# Patient Record
Sex: FEMALE | Race: WHITE | URBAN - METROPOLITAN AREA
[De-identification: names, ages, dates, MRNs, and addresses within clinical notes are randomized per-mention and may not be internally consistent; named-entity substitution may affect disease eponyms.]

---

## 2017-01-02 PROCEDURE — G0180 MD CERTIFICATION HHA PATIENT: HCPCS | Performed by: INTERNAL MEDICINE

## 2017-01-06 ENCOUNTER — OFFICE VISIT (OUTPATIENT)
Dept: OPTOMETRY | Facility: CLINIC | Age: 82
End: 2017-01-06
Payer: COMMERCIAL

## 2017-01-06 DIAGNOSIS — H01.02A SQUAMOUS BLEPHARITIS OF UPPER AND LOWER EYELIDS OF BOTH EYES: Primary | ICD-10-CM

## 2017-01-06 DIAGNOSIS — H01.02B SQUAMOUS BLEPHARITIS OF UPPER AND LOWER EYELIDS OF BOTH EYES: Primary | ICD-10-CM

## 2017-01-06 DIAGNOSIS — H26.9 CATARACTS, BOTH EYES: ICD-10-CM

## 2017-01-06 DIAGNOSIS — H40.1490 PSEUDOEXFOLIATION (PXF) GLAUCOMA: ICD-10-CM

## 2017-01-06 PROCEDURE — 99202 OFFICE O/P NEW SF 15 MIN: CPT | Performed by: OPTOMETRIST

## 2017-01-06 ASSESSMENT — CUP TO DISC RATIO
OS_RATIO: 0.8
OD_RATIO: 0.8

## 2017-01-06 ASSESSMENT — TONOMETRY
IOP_METHOD: TONOPEN
OS_IOP_MMHG: 18
OD_IOP_MMHG: 21

## 2017-01-06 ASSESSMENT — VISUAL ACUITY
OS_CC: 20/50
OS_CC+: -1
OD_CC+: -1
CORRECTION_TYPE: GLASSES
METHOD: SNELLEN - LINEAR
OD_CC: 20/60

## 2017-01-06 ASSESSMENT — EXTERNAL EXAM - LEFT EYE: OS_EXAM: NORMAL

## 2017-01-06 ASSESSMENT — EXTERNAL EXAM - RIGHT EYE: OD_EXAM: NORMAL

## 2017-01-06 NOTE — PATIENT INSTRUCTIONS
Warm packs nightly.  Lid scrubs nightly with Ocusoft of Systane pads or with diluted baby shampoo.  Artificial tears 3-4 x daily (Systane Balance)    You have cataracts which are affecting your vision.  A change in glasses will not give you much improvement.  A cataract evaluation can be scheduled with the eye surgeon to discuss with you the option of cataract surgery.     Referral to Dr. Martins at [facility] for cataract and glaucoma evaluation.    Rashawn Mahajan, OD

## 2017-01-06 NOTE — MR AVS SNAPSHOT
After Visit Summary   1/6/2017    Brissa Hunter    MRN: [id number]           Patient Information     Date Of Birth          1935        Visit Information        Provider Department      1/6/2017 11:30 AM Rashawn Mahajan, OD [facility]        Today's Diagnoses     Squamous blepharitis of upper and lower eyelids of both eyes    -  1     Cataracts, both eyes         Pseudoexfoliation (PXF) glaucoma           Care Instructions    Warm packs nightly.  Lid scrubs nightly with Ocusoft of Systane pads or with diluted baby shampoo.  Artificial tears 3-4 x daily (Systane Balance)    You have cataracts which are affecting your vision.  A change in glasses will not give you much improvement.  A cataract evaluation can be scheduled with the eye surgeon to discuss with you the option of cataract surgery.     Referral to Dr. Martins at [facility] for cataract and glaucoma evaluation.    Rashawn Mahajan, MARCELA          Follow-ups after your visit        Additional Services     OPHTHALMOLOGY ADULT REFERRAL       Your provider has referred you to:  [facility]: [facility] [phone]   [email or web address]      Please be aware that coverage of these services is subject to the terms and limitations of your health insurance plan.  Call member services at your health plan with any benefit or coverage questions.      Please bring the following to your appointment:  >>   Any x-rays, CTs or MRIs which have been performed.  Contact the facility where they were done to arrange for  prior to your scheduled appointment.  Any new CT, MRI or other procedures ordered by your specialist must be performed at a [facility] or coordinated by your clinic's referral office.    >>   List of current medications   >>   This referral request   >>   Any documents/labs given to you for this referral                  Your  next 10 appointments already scheduled     2017 10:00 AM   New Visit with David Martins MD,  OPH NURSE ONLY   [facility] ([facility]    [address] [address] [place] [address] [phone]            Mar 01, 2017 11:40 AM   Return Visit with Radha Castanon MD   [facility] ([facility])    [address] [address] [address] [phone]              Who to contact     If you have questions or need follow up information about today's clinic visit or your schedule please contact [facility] directly at [phone].  Normal or non-critical lab and imaging results will be communicated to you by MyChart, letter or phone within 4 business days after the clinic has received the results. If you do not hear from us within 7 days, please contact the clinic through MyChart or phone. If you have a critical or abnormal lab result, we will notify you by phone as soon as possible.  Submit refill requests through [employer] or call your pharmacy and they will forward the refill request to us. Please allow 3 business days for your refill to be completed.          Additional Information About Your Visit        [employer] Information     [employer] is an electronic gateway that provides easy, online access to your medical records. With [employer], you can request a clinic appointment, read your test results, renew a prescription or communicate with your care team.     To sign up for [employer] visit the website at [email or web address]   You will be asked to enter the access code listed below, as well as some personal information. Please follow the directions to create your username and password.     Your access code is: [email or web address]  Expires: 3/28/2017 11:55 AM     Your access code will  in 90 days. If you need help or a new code, please contact your [facility] or call  [phone] for assistance.        Care EveryWhere ID     This is your Care EveryWhere ID. This could be used by other organizations to access your [place] medical records  [id number]         Blood Pressure from Last 3 Encounters:   12/28/16 110/70   06/27/16 150/50    Weight from Last 3 Encounters:   12/28/16 64.411 kg (142 lb)   06/27/16 60.328 kg (133 lb)              We Performed the Following     OPHTHALMOLOGY ADULT REFERRAL        Primary Care Provider Office Phone # Fax #    Radha Castanon [phone]       [facility] [address]  [facility] [address]        Thank you!     Thank you for choosing [facility]  for your care. Our goal is always to provide you with excellent care. Hearing back from our patients is one way we can continue to improve our services. Please take a few minutes to complete the written survey that you may receive in the mail after your visit with us. Thank you!             Your Updated Medication List - Protect others around you: Learn how to safely use, store and throw away your medicines at www.disposemymeds.org.          This list is accurate as of: 1/6/17 12:31 PM.  Always use your most recent med list.                   Brand Name Dispense Instructions for use    ascorbic acid 500 MG tablet    VITAMIN C     Take 1 tablet by mouth daily       aspirin EC 81 MG EC tablet      Take 1 tablet by mouth daily       calcium-vitamin D 600-400 MG-UNIT per tablet    CALTRATE     Take 1 tablet by mouth daily       MULTI-VITAMINS Tabs      Take 1 tablet by mouth daily       vitamin E 400 UNIT capsule      Take 1 capsule by mouth daily

## 2017-01-06 NOTE — PROGRESS NOTES
Chief Complaint   Patient presents with     Eye Problem     os eye infection     Saw Dr. Castanon- 12/28/16 and was prescribed Polytrim left eye due to red left eye.  She referred for follow up and comprehensive evaluation.  Patient had an eye exam at [employer] and got new glasses 1-2 months ago.    Eyes feel: better-    Medications used: polymyxin b    How often: 3 times per day    Patient wants to know if it is pink eye      Kelley Simon, Optometric Assistant, A.B.O.C.     OBJECTIVE:     See ophthalmology exam      ASSESSMENT:        ICD-10-CM    1. Squamous blepharitis of upper and lower eyelids of both eyes H01.021     H01.022     H01.024     H01.025    2. Cataracts, both eyes H26.9 OPHTHALMOLOGY ADULT REFERRAL   3. Pseudoexfoliation (PXF) glaucoma H40.1490 OPHTHALMOLOGY ADULT REFERRAL          PLAN:       Patient Instructions   Warm packs nightly.  Lid scrubs nightly with Ocusoft of Systane pads or with diluted baby shampoo.  Artificial tears 3-4 x daily (Systane Balance)    You have cataracts which are affecting your vision.  A change in glasses will not give you much improvement.  A cataract evaluation can be scheduled with the eye surgeon to discuss with you the option of cataract surgery.     Referral to Dr. Martins at [email or web address] for cataract and glaucoma evaluation.    Rashawn Mahajan, OD

## 2017-01-12 ENCOUNTER — TELEPHONE (OUTPATIENT)
Dept: PEDIATRICS | Facility: CLINIC | Age: 82
End: 2017-01-12

## 2017-01-12 NOTE — TELEPHONE ENCOUNTER
[facility] Call Center    Phone Message    Name of Caller: Brissa    Phone Number: Home number on file [phone] (home)    Best time to return call: ANY    May a detailed message be left on voicemail: yes    Relation to patient: Self    Reason for Call: Other: Patient is wondering about the note that Dr. Castanon wrote about her dementia. Wants to speak to Dr. Castanon directly, whenever possible.     Action Taken: Message routed to:  Primary Care p [phone]

## 2017-01-13 ENCOUNTER — DOCUMENTATION ONLY (OUTPATIENT)
Dept: CARE COORDINATION | Facility: CLINIC | Age: 82
End: 2017-01-13

## 2017-01-13 DIAGNOSIS — Z71.89 ADVANCED DIRECTIVES, COUNSELING/DISCUSSION: Primary | ICD-10-CM

## 2017-01-13 NOTE — PROGRESS NOTES
[facility] now requests orders and shares plan of care/discharge summaries for some patients through [employer].  Please REPLY TO THIS MESSAGE in order to give authorization for orders when needed.  This is considered a verbal order, you will still receive a faxed copy of orders for signature.  Thank you for your assistance in improving collaboration for our patients.      DISCHARGE SUMMARY  Patient participated in the Short Cognitive Performance Test with a score of 4.5/5.6, Global Deterioration Scale 5, and Short Blessed Test with a score of 16/28. Results indicate a moderate functional decline from abstract to concrete thought processes. Patient requires 24 hour supervision. Abstract thought processes including memory, judgment, reasoning, and planning ahead are very impaired. Familiar concrete tasks such as dressing and grooming are remembered, but the quality of performance may show a decline, not noted in this patients case. Persons at this level do best with simplified tasks and the same routine. Complex tasks, such as meal preparation, medication instructions, and finances need to be done by others, per Gregorio Cognitive Level 4. It is also recommended that patient not be driving based on her cognitive test scores. Patient will be staying in her home with her , per daughters report. Recommendations given to daughter and spouse based on cognitive level, and recommendations for preparation for potential further decline provided.. Patient will not be driving, license taken away, and will receive all necessary assistance/ supervision from spouse. Patient is physically fit and able to ambulate on uneven surfaces and up and down stairs without an AD. Patient is able to use telephone to answer calls, difficulty noted in placing calls. Patient had also seen  for 1 visit for initiation of advanced care planning and community resources provided. PT for balance assessment, scored a 28/28 on  Jossetti, indicative of minimal fall risk. SN for dementia education. Patient and daughter requested discharge from all homecare services, due to increased anxiety with people coming in and out of her home.

## 2017-01-23 ENCOUNTER — PRE VISIT (OUTPATIENT)
Dept: OPHTHALMOLOGY | Facility: CLINIC | Age: 82
End: 2017-01-23

## 2017-01-23 NOTE — TELEPHONE ENCOUNTER
January 23, 2017                  Chief Complaint   Patient presents with     Previsit     appt w/ ophthalmology     Cataract Evaluation     Glaucoma Evaluation       Pre-Visit Documentation: Brissa Hunter is a 81 year old female      Current Outpatient Prescriptions   Medication Sig Dispense Refill     aspirin EC 81 MG tablet Take 1 tablet by mouth daily       calcium-vitamin D (CALTRATE) 600-400 MG-UNIT per tablet Take 1 tablet by mouth daily       Multiple Vitamin (MULTI-VITAMINS) TABS Take 1 tablet by mouth daily       ascorbic acid (VITAMIN C) 500 MG tablet Take 1 tablet by mouth daily       vitamin E 400 UNIT capsule Take 1 capsule by mouth daily         Mayra ROACH COA.

## 2017-01-26 NOTE — PROGRESS NOTES
Reviewed POLST: indicated CPR, limited interventions and treatment reversible conditions: trial intubation allow HC agents make decision pending on medical advise.

## 2017-02-15 ENCOUNTER — OFFICE VISIT (OUTPATIENT)
Dept: NEUROLOGY | Facility: CLINIC | Age: 82
End: 2017-02-15
Payer: COMMERCIAL

## 2017-02-15 VITALS
OXYGEN SATURATION: 95 % | HEART RATE: 69 BPM | WEIGHT: 144.2 LBS | BODY MASS INDEX: 24.75 KG/M2 | DIASTOLIC BLOOD PRESSURE: 77 MMHG | SYSTOLIC BLOOD PRESSURE: 170 MMHG

## 2017-02-15 DIAGNOSIS — F03.90 DEMENTIA WITHOUT BEHAVIORAL DISTURBANCE, UNSPECIFIED DEMENTIA TYPE: ICD-10-CM

## 2017-02-15 DIAGNOSIS — R41.3 MEMORY LOSS: Primary | ICD-10-CM

## 2017-02-15 PROCEDURE — 99202 OFFICE O/P NEW SF 15 MIN: CPT | Performed by: PSYCHIATRY & NEUROLOGY

## 2017-02-15 NOTE — LETTER
"2/15/2017      RE: Brissa Hunter  [address] [facility] [address]       HISTORY OF PRESENT ILLNESS:  Brissa Hunter is an 81-year-old female referred by Dr. Castanon for evaluation of memory loss.  She is accompanied by her .      According to the patient she has had some issues with her memory for a few years.  It has probably been at least 5 years, according to her .  It is notable that Dr. Castanon mentioned that her  may also have mild cognitive impairment.      She does tell me that she will misplace things.  She may forget appointments.  She quit driving at Dr. Castanon's recommendation, although she and her  indicate she had never gotten lost.  Her  has always handled the bills.  She does very little shopping and they get some of their food by home delivery.  She does keep up the house.  She has not demonstrated any unsafe behaviors such as leaving the stove on or the door open.      This has all been a gradual process.  There have been no sudden neurologic symptoms or focal neurologic symptoms.  She denies headaches.      Her past medical history is otherwise unremarkable.  She is really not taking any medication on a regular basis, although she will sporadically take aspirin 81 mg.      ALLERGIES:  No medical allergies.      FAMILY HISTORY:  Notable for perhaps the patient's mother having had trouble with memory later in life.      SOCIAL HISTORY:  She does live with her .  She does not smoke and rarely has a drink of wine.      PHYSICAL EXAMINATION:   GENERAL:  Reveals a pleasant and neatly dressed, elderly female.   VITAL SIGNS:  Heart rate 69.  Blood pressure 170/77.   NEUROLOGIC:  She scored 20/30 on the Mini-Mental State Examination.  She had 5 errors with orientation, failed to recall any of 3 objects, and 1 error spelling the word \"world\" backward.  She could not copy a design accurately.      Her clock drawing was accurate in terms of placement of the numerals but " she was unable to place the hands on the clock at the requested time.      Pupils are equal, round and react well to light.  Visual fields are intact.  Speech is clear.  She rarely makes paraphasic error with her spontaneous speech, but otherwise no major language disturbance is noted.      CRANIAL NERVES:  Cranial nerves II-XII are intact.   MOTOR:  Sensory examinations are normal.   GAIT, STATION AND COORDINATION:  Finger-to-nose is done well.  Her gait is unremarkable for age.  Muscle tone is normal.   REFLEXES:  2+ except for the left ankle jerk which is absent.  Plantar responses are flexor.      IMPRESSION:  Cognitive impairment, likely dementia.      PLAN:  Dr. Castanon had ordered a number of laboratory studies when the patient was seen on 2016 but she did not go to the lab to have these drawn.  I have therefore recommended that she do so.  This will include a CBC, thyroid functions, B12, comprehensive metabolic panel, vitamin D level, lipids, hemoglobin A1c .  I have added a methylmalonic acid level.      She has not had any brain imaging done.  We discussed doing an MRI scan, but she would prefer not to do one because of potential claustrophobia.  She did, however, agree to have a head CT scan done which will be ordered.      I briefly touched upon symptomatic treatment for her memory with donepezil and reviewed potential side effects.  She will think about this.  I did write out the name of the medication and advised the patient and her  they could contact her children to discuss that with them as well.      I do plan to see her back in a few weeks to review testing.  At that point also rediscuss a trial of donepezil.         CHANCE MALLOY MD           cc: Radha Castanon MD      D: 02/15/2017 15:47   T: 02/15/2017 16:16   MT: ARMIN#[id number]      Name:     JACKELYN COLUNGA   MRN:      [id number]        Account:      [id number]   :      1935           Visit Date:   02/15/2017      Document: [id number]

## 2017-02-15 NOTE — PROGRESS NOTES
"HISTORY OF PRESENT ILLNESS:  Brissa Hunter is an 81-year-old female referred by Dr. Castanon for evaluation of memory loss.  She is accompanied by her .      According to the patient she has had some issues with her memory for a few years.  It has probably been at least 5 years, according to her .  It is notable that Dr. Castanon mentioned that her  may also have mild cognitive impairment.      She does tell me that she will misplace things.  She may forget appointments.  She quit driving at Dr. Castanon's recommendation, although she and her  indicate she had never gotten lost.  Her  has always handled the bills.  She does very little shopping and they get some of their food by home delivery.  She does keep up the house.  She has not demonstrated any unsafe behaviors such as leaving the stove on or the door open.      This has all been a gradual process.  There have been no sudden neurologic symptoms or focal neurologic symptoms.  She denies headaches.      Her past medical history is otherwise unremarkable.  She is really not taking any medication on a regular basis, although she will sporadically take aspirin 81 mg.      ALLERGIES:  No medical allergies.      FAMILY HISTORY:  Notable for perhaps the patient's mother having had trouble with memory later in life.      SOCIAL HISTORY:  She does live with her .  She does not smoke and rarely has a drink of wine.      PHYSICAL EXAMINATION:   GENERAL:  Reveals a pleasant and neatly dressed, elderly female.   VITAL SIGNS:  Heart rate 69.  Blood pressure 170/77.   NEUROLOGIC:  She scored 20/30 on the Mini-Mental State Examination.  She had 5 errors with orientation, failed to recall any of 3 objects, and 1 error spelling the word \"world\" backward.  She could not copy a design accurately.      Her clock drawing was accurate in terms of placement of the numerals but she was unable to place the hands on the clock at the requested time.      Pupils " are equal, round and react well to light.  Visual fields are intact.  Speech is clear.  She rarely makes paraphasic error with her spontaneous speech, but otherwise no major language disturbance is noted.      CRANIAL NERVES:  Cranial nerves II-XII are intact.   MOTOR:  Sensory examinations are normal.   GAIT, STATION AND COORDINATION:  Finger-to-nose is done well.  Her gait is unremarkable for age.  Muscle tone is normal.   REFLEXES:  2+ except for the left ankle jerk which is absent.  Plantar responses are flexor.      IMPRESSION:  Cognitive impairment, likely dementia.      PLAN:  Dr. Castanon had ordered a number of laboratory studies when the patient was seen on 2016 but she did not go to the lab to have these drawn.  I have therefore recommended that she do so.  This will include a CBC, thyroid functions, B12, comprehensive metabolic panel, vitamin D level, lipids, hemoglobin A1c .  I have added a methylmalonic acid level.      She has not had any brain imaging done.  We discussed doing an MRI scan, but she would prefer not to do one because of potential claustrophobia.  She did, however, agree to have a head CT scan done which will be ordered.      I briefly touched upon symptomatic treatment for her memory with donepezil and reviewed potential side effects.  She will think about this.  I did write out the name of the medication and advised the patient and her  they could contact her children to discuss that with them as well.      I do plan to see her back in a few weeks to review testing.  At that point also rediscuss a trial of donepezil.         CHANCE MALLOY MD             D: 02/15/2017 15:47   T: 02/15/2017 16:16   MT: ARMIN#[id number]      Name:     JACKELYN COLUNGA   MRN:      [id number]        Account:      [id number]   :      1935           Visit Date:   02/15/2017      Document: [id number]       cc: Radha Castanon MD

## 2017-02-15 NOTE — PATIENT INSTRUCTIONS
The medication we discussed for your memory is Donepezil (Aricept)      Thank you for choosing [facility]. It was a pleasure to see you for your office visit today.     The following is a summary of your office visit:      Appointments Made today:Return appointment with Dr. Bonilla 3/08/17 at 8:00am    Nurse/clinic contact information:[facility] [phone]    Further instructions for your care: Call and scheduled CT scan- (head) [phone].   This [facility] done at [facility]     (Patient and her  requested to scheduled the CT scan from home after checking their calender.)        If you had any blood work, imaging or other test we will be in touch regarding the results. If there is anything abnormal you will be contacted by phone and if the results are normal you will receive a letter in the mail. If you have any questions or concerns please contact us at [phone].

## 2017-02-15 NOTE — LETTER
2/15/2017       RE: Brissa Hunter  [address] [facility] [address]     Dear Colleague,    Thank you for referring your patient, Brissa Hunter, to the [facility]. Please see a copy of my visit note below.    No notes on file    Again, thank you for allowing me to participate in the care of your patient.      Sincerely,    Allen Bonilla MD

## 2017-02-15 NOTE — NURSING NOTE
"Brissa Hunter's goals for this visit include: Dementia  She requests these members of her care team be copied on today's visit information: yes    PCP: Radha Castanon    Referring Provider:  Radha Castanon MD  [facility]  [address] [place] [address]    Chief Complaint   Patient presents with     Consult       Initial /77 (BP Location: Left arm, Patient Position: Chair, Cuff Size: Adult Regular)  Pulse 69  Wt 65.4 kg (144 lb 3.2 oz)  SpO2 95%  BMI 24.75 kg/m2 Estimated body mass index is 24.75 kg/(m^2) as calculated from the following:    Height as of 6/27/16: 1.626 m (5' 4\").    Weight as of this encounter: 65.4 kg (144 lb 3.2 oz).  Medication Reconciliation: complete    Do you need any medication refills at today's visit? no    "

## 2017-02-15 NOTE — MR AVS SNAPSHOT
After Visit Summary   2/15/2017    Brissa Hunter    MRN: [id number]           Patient Information     Date Of Birth          1935        Visit Information        Provider Department      2/15/2017 3:30 PM Allen Bonilla MD [facility]        Today's Diagnoses     Memory loss    -  1    Dementia without behavioral disturbance, unspecified dementia type          Care Instructions    The medication we discussed for your memory is Donepezil (Aricept)      Thank you for choosing [facility]. It was a pleasure to see you for your office visit today.     The following is a summary of your office visit:      Appointments Made today:Return appointment with Dr. Bonilla 3/08/17 at 8:00am    Nurse/clinic contact information:[facility] [phone]    Further instructions for your care: Call and scheduled CT scan- (head) [phone].   This can be done at [facility]     (Patient and her  requested to scheduled the CT scan from home after checking their calender.)        If you had any blood work, imaging or other test we will be in touch regarding the results. If there is anything abnormal you will be contacted by phone and if the results are normal you will receive a letter in the mail. If you have any questions or concerns please contact us at [phone].                 Follow-ups after your visit        Follow-up notes from your care team     Inform patient at next visit Return in about 2 weeks (around 3/1/2017).      Your next 10 appointments already scheduled     Mar 01, 2017 11:40 AM CST   Return Visit with Radha Castanon MD   [facility])    [address] [place] [address] [phone]            Mar 08, 2017  8:00 AM CST   Return Visit with Allen Bonilla MD   [facility])    [address] [address]  [address]   [phone]              Future tests that were ordered for you today     Open Future Orders        Priority Expected Expires Ordered    CT Head w/o Contrast Routine 2017 2/15/2018 2/15/2017            Who to contact     If you have questions or need follow up information about today's clinic visit or your schedule please contact [facility] directly at [phone].  Normal or non-critical lab and imaging results will be communicated to you by HOMETRAXhart, letter or phone within 4 business days after the clinic has received the results. If you do not hear from us within 7 days, please contact the clinic through HOMETRAXhart or phone. If you have a critical or abnormal lab result, we will notify you by phone as soon as possible.  Submit refill requests through Physicians Formula or call your pharmacy and they will forward the refill request to us. Please allow 3 business days for your refill to be completed.          Additional Information About Your Visit        Physicians Formula Information     [employer]icians Formula is an electronic gateway that provides easy, online access to your medical records. With [employer] Formula, you can request a clinic appointment, read your test results, renew a prescription or communicate with your care team.     To sign up for Physicians Formula visit the website at [email or web address]   You will be asked to enter the access code listed below, as well as some personal information. Please follow the directions to create your username and password.     Your access code is: [email or web address]  Expires: 3/28/2017 11:55 AM     Your access code will  in 90 days. If you need help or a new code, please contact your [facility] or call [phone] for assistance.        Care EveryWhere ID     This is your Care EveryWhere ID. This could be used by other organizations to access your [facility] medical records  [id number]        Your Vitals Were     Pulse Pulse Oximetry BMI (Body Mass  Index)             69 95% 24.75 kg/m2          Blood Pressure from Last 3 Encounters:   02/15/17 170/77   12/28/16 110/70   06/27/16 150/50    Weight from Last 3 Encounters:   02/15/17 65.4 kg (144 lb 3.2 oz)   12/28/16 64.4 kg (142 lb)   06/27/16 60.3 kg (133 lb)              We Performed the Following     Methylmalonic acid        Primary Care Provider Office Phone # Fax #    Radha Castanon [phone]       [facility] [address]  [facility] [address]        Thank you!     Thank you for choosing [facility]  for your care. Our goal is always to provide you with excellent care. Hearing back from our patients is one way we can continue to improve our services. Please take a few minutes to complete the written survey that you may receive in the mail after your visit with us. Thank you!             Your Updated Medication List - Protect others around you: Learn how to safely use, store and throw away your medicines at www.disposemymeds.org.          This list is accurate as of: 2/15/17  3:46 PM.  Always use your most recent med list.                   Brand Name Dispense Instructions for use    ascorbic acid 500 MG tablet    VITAMIN C     Take 1 tablet by mouth daily       aspirin EC 81 MG EC tablet      Take 1 tablet by mouth daily       calcium-vitamin D 600-400 MG-UNIT per tablet    CALTRATE     Take 1 tablet by mouth daily       MULTI-VITAMINS Tabs      Take 1 tablet by mouth daily       vitamin E 400 UNIT capsule      Take 1 capsule by mouth daily

## 2017-02-16 ENCOUNTER — TELEPHONE (OUTPATIENT)
Dept: NEUROLOGY | Facility: CLINIC | Age: 82
End: 2017-02-16

## 2017-02-16 NOTE — TELEPHONE ENCOUNTER
Writer tried to call the number listed in the pt's chart but it went right to , a message was left on the  requesting a call back.  Carrie Al RN

## 2017-02-16 NOTE — TELEPHONE ENCOUNTER
----- Message from Allen Bonilla MD sent at 2/16/2017  6:37 AM CST -----  Regarding: Labs  Patient did not go to lab after her visit with me. Could you please call her  and ask him to bring her in to have labs done? Thanks JULIUS Bonilal

## 2017-02-17 NOTE — TELEPHONE ENCOUNTER
Writer tried to call again but there was no answer and no ability to leave a message on the cell phone listed.  Carrie Al RN

## 2017-02-21 NOTE — TELEPHONE ENCOUNTER
Called and spoke with . Patient has a appt with Dr. Castanon on 3/1/17. They will get labs done at that time. Lab appt made.  Darla Severin-Brown, LPN

## 2017-03-01 ENCOUNTER — OFFICE VISIT (OUTPATIENT)
Dept: PEDIATRICS | Facility: CLINIC | Age: 82
End: 2017-03-01
Payer: COMMERCIAL

## 2017-03-01 VITALS
BODY MASS INDEX: 24.37 KG/M2 | TEMPERATURE: 97.2 F | DIASTOLIC BLOOD PRESSURE: 78 MMHG | SYSTOLIC BLOOD PRESSURE: 144 MMHG | HEART RATE: 75 BPM | WEIGHT: 142 LBS

## 2017-03-01 DIAGNOSIS — R41.3 MEMORY LOSS: ICD-10-CM

## 2017-03-01 DIAGNOSIS — F03.90 DEMENTIA WITHOUT BEHAVIORAL DISTURBANCE, UNSPECIFIED DEMENTIA TYPE: ICD-10-CM

## 2017-03-01 DIAGNOSIS — R73.09 BLOOD GLUCOSE ABNORMAL: ICD-10-CM

## 2017-03-01 DIAGNOSIS — Z13.6 CARDIOVASCULAR SCREENING; LDL GOAL LESS THAN 160: ICD-10-CM

## 2017-03-01 DIAGNOSIS — E78.00 PURE HYPERCHOLESTEROLEMIA: ICD-10-CM

## 2017-03-01 DIAGNOSIS — E78.5 HYPERLIPIDEMIA LDL GOAL <160: ICD-10-CM

## 2017-03-01 DIAGNOSIS — F03.90 DEMENTIA WITHOUT BEHAVIORAL DISTURBANCE, UNSPECIFIED DEMENTIA TYPE: Primary | ICD-10-CM

## 2017-03-01 LAB
ALBUMIN SERPL-MCNC: 3.9 G/DL (ref 3.4–5)
ALP SERPL-CCNC: 65 U/L (ref 40–150)
ALT SERPL W P-5'-P-CCNC: 24 U/L (ref 0–50)
ANION GAP SERPL CALCULATED.3IONS-SCNC: 7 MMOL/L (ref 3–14)
AST SERPL W P-5'-P-CCNC: 21 U/L (ref 0–45)
BASOPHILS # BLD AUTO: 0 10E9/L (ref 0–0.2)
BASOPHILS NFR BLD AUTO: 0.5 %
BILIRUB SERPL-MCNC: 1.4 MG/DL (ref 0.2–1.3)
BUN SERPL-MCNC: 23 MG/DL (ref 7–30)
CALCIUM SERPL-MCNC: 9.2 MG/DL (ref 8.5–10.1)
CHLORIDE SERPL-SCNC: 105 MMOL/L (ref 94–109)
CHOLEST SERPL-MCNC: 257 MG/DL
CO2 SERPL-SCNC: 29 MMOL/L (ref 20–32)
CREAT SERPL-MCNC: 0.87 MG/DL (ref 0.52–1.04)
DIFFERENTIAL METHOD BLD: NORMAL
EOSINOPHIL # BLD AUTO: 0.1 10E9/L (ref 0–0.7)
EOSINOPHIL NFR BLD AUTO: 1.2 %
ERYTHROCYTE [DISTWIDTH] IN BLOOD BY AUTOMATED COUNT: 13.3 % (ref 10–15)
GFR SERPL CREATININE-BSD FRML MDRD: 62 ML/MIN/1.7M2
GLUCOSE SERPL-MCNC: 86 MG/DL (ref 70–99)
HBA1C MFR BLD: 5.5 % (ref 4.3–6)
HCT VFR BLD AUTO: 40.6 % (ref 35–47)
HDLC SERPL-MCNC: 88 MG/DL
HGB BLD-MCNC: 13.4 G/DL (ref 11.7–15.7)
LDLC SERPL CALC-MCNC: 149 MG/DL
LYMPHOCYTES # BLD AUTO: 1.3 10E9/L (ref 0.8–5.3)
LYMPHOCYTES NFR BLD AUTO: 22.8 %
MCH RBC QN AUTO: 30.7 PG (ref 26.5–33)
MCHC RBC AUTO-ENTMCNC: 33 G/DL (ref 31.5–36.5)
MCV RBC AUTO: 93 FL (ref 78–100)
MONOCYTES # BLD AUTO: 0.4 10E9/L (ref 0–1.3)
MONOCYTES NFR BLD AUTO: 7.1 %
NEUTROPHILS # BLD AUTO: 3.9 10E9/L (ref 1.6–8.3)
NEUTROPHILS NFR BLD AUTO: 68.4 %
NONHDLC SERPL-MCNC: 169 MG/DL
PLATELET # BLD AUTO: 206 10E9/L (ref 150–450)
POTASSIUM SERPL-SCNC: 3.9 MMOL/L (ref 3.4–5.3)
PROT SERPL-MCNC: 7.8 G/DL (ref 6.8–8.8)
RBC # BLD AUTO: 4.37 10E12/L (ref 3.8–5.2)
SODIUM SERPL-SCNC: 141 MMOL/L (ref 133–144)
T4 FREE SERPL-MCNC: 0.79 NG/DL (ref 0.76–1.46)
TRIGL SERPL-MCNC: 100 MG/DL
TSH SERPL DL<=0.05 MIU/L-ACNC: 3.12 MU/L (ref 0.4–4)
VIT B12 SERPL-MCNC: 525 PG/ML (ref 193–986)
WBC # BLD AUTO: 5.7 10E9/L (ref 4–11)

## 2017-03-01 PROCEDURE — 84439 ASSAY OF FREE THYROXINE: CPT | Performed by: INTERNAL MEDICINE

## 2017-03-01 PROCEDURE — 80053 COMPREHEN METABOLIC PANEL: CPT | Performed by: INTERNAL MEDICINE

## 2017-03-01 PROCEDURE — 82306 VITAMIN D 25 HYDROXY: CPT | Performed by: INTERNAL MEDICINE

## 2017-03-01 PROCEDURE — 82607 VITAMIN B-12: CPT | Performed by: INTERNAL MEDICINE

## 2017-03-01 PROCEDURE — 80061 LIPID PANEL: CPT | Performed by: INTERNAL MEDICINE

## 2017-03-01 PROCEDURE — 85025 COMPLETE CBC W/AUTO DIFF WBC: CPT | Performed by: INTERNAL MEDICINE

## 2017-03-01 PROCEDURE — 84443 ASSAY THYROID STIM HORMONE: CPT | Performed by: INTERNAL MEDICINE

## 2017-03-01 PROCEDURE — 36415 COLL VENOUS BLD VENIPUNCTURE: CPT | Performed by: INTERNAL MEDICINE

## 2017-03-01 PROCEDURE — 83921 ORGANIC ACID SINGLE QUANT: CPT | Mod: 90 | Performed by: INTERNAL MEDICINE

## 2017-03-01 PROCEDURE — 83036 HEMOGLOBIN GLYCOSYLATED A1C: CPT | Performed by: INTERNAL MEDICINE

## 2017-03-01 PROCEDURE — 99000 SPECIMEN HANDLING OFFICE-LAB: CPT | Performed by: INTERNAL MEDICINE

## 2017-03-01 PROCEDURE — 99213 OFFICE O/P EST LOW 20 MIN: CPT | Performed by: INTERNAL MEDICINE

## 2017-03-01 NOTE — PATIENT INSTRUCTIONS
Make an appointment for:  Clinic follow up with Dr. Castanon in 5 week.     I will contact Dr. Bonilla's office regarding prescribing memory medication.     If you have side effect of the medicine, you can stop taking it and notify the clinic.

## 2017-03-01 NOTE — Clinical Note
This patient is now accepting medication for dementia. Could you check with Dr. Bonilla about prescribing Donepezil for her (suggested in his note).?  Thanks. Dr. Castanon

## 2017-03-01 NOTE — MR AVS SNAPSHOT
After Visit Summary   3/1/2017    Brissa Hunter    MRN: [id number]           Patient Information     Date Of Birth          1935        Visit Information        Provider Department      3/1/2017 11:40 AM Radha Castanon MD [facility]        Care Instructions    Make an appointment for:  Clinic follow up with Dr. Castanon in 5 week.     I will contact Dr. Bonilla's office regarding prescribing memory medication.     If you have side effect of the medicine, you can stop taking it and notify the clinic.               Follow-ups after your visit        Your next 10 appointments already scheduled     Mar 08, 2017  8:00 AM CST   Return Visit with Allen Bonilla MD   [facility]    [address] [place] [address] [phone]              Who to contact     If you have questions or need follow up information about today's clinic visit or your schedule please contact [facility] directly at [phone].  Normal or non-critical lab and imaging results will be communicated to you by FoodByNethart, letter or phone within 4 business days after the clinic has received the results. If you do not hear from us within 7 days, please contact the clinic through FoodByNethart or phone. If you have a critical or abnormal lab result, we will notify you by phone as soon as possible.  Submit refill requests through Blackberry or call your pharmacy and they will forward the refill request to us. Please allow 3 business days for your refill to be completed.          Additional Information About Your Visit        FoodByNethart Information     Blackberry is an electronic gateway that provides easy, online access to your medical records. With Blackberry, you can request a clinic appointment, read your test results, renew a prescription or communicate with your care team.     To sign up for Blackberry visit the website at [email or web address]   You will be  asked to enter the access code listed below, as well as some personal information. Please follow the directions to create your username and password.     Your access code is: [email or web address]  Expires: 3/28/2017 11:55 AM     Your access code will  in 90 days. If you need help or a new code, please contact your [facility] or call [phone] for assistance.        Care EveryWhere ID     This is your Care EveryWhere ID. This could be used by other organizations to access your [facility] medical records  [id number]        Your Vitals Were     Pulse Temperature BMI (Body Mass Index)             75 97.2  F (36.2  C) (Temporal) 24.37 kg/m2          Blood Pressure from Last 3 Encounters:   17 144/78   02/15/17 170/77   16 110/70    Weight from Last 3 Encounters:   17 142 lb (64.4 kg)   02/15/17 144 lb 3.2 oz (65.4 kg)   16 142 lb (64.4 kg)              Today, you had the following     No orders found for display       Primary Care Provider Office Phone # Fax #    Radha Castanon [phone]       [facility] [address] [facility] [address]        Thank you!     Thank you for choosing [facility]  for your care. Our goal is always to provide you with excellent care. Hearing back from our patients is one way we can continue to improve our services. Please take a few minutes to complete the written survey that you may receive in the mail after your visit with us. Thank you!             Your Updated Medication List - Protect others around you: Learn how to safely use, store and throw away your medicines at www.disposemymeds.org.          This list is accurate as of: 3/1/17 12:13 PM.  Always use your most recent med list.                   Brand Name Dispense Instructions for use    ascorbic acid 500 MG tablet    VITAMIN C     Take 1 tablet by mouth daily       aspirin EC 81 MG EC tablet      Take 1 tablet by mouth daily        calcium-vitamin D 600-400 MG-UNIT per tablet    CALTRATE     Take 1 tablet by mouth daily       MULTI-VITAMINS Tabs      Take 1 tablet by mouth daily       vitamin E 400 UNIT capsule      Take 1 capsule by mouth daily

## 2017-03-01 NOTE — PROGRESS NOTES
SUBJECTIVE:                                                    Brissa Hunter is a 81 year old female who presents to clinic today for the following health issues:      Follow up memory  Maryjo Oakley RMTESSIE Brumfield  does not have any pertinent problems on file.     She saw Dr. Bonilla in neurology for memory loss. Patient does not recall that appointment at all. She recalled that a man was to talk her about dementia but when she went, no one was there. She didn't know where she was to meet this men.     Per Dr. Bonilla's note, doneprazil was discussed but she wanted to think about it. Pt now says she is not adverse to taking medication to help with memory. Her  said she doesn't like to take medication in general.     I asked her today again about trying medication to help her memory. She said she will try it.     She has no other complaints. Her , with memory loss, is still driving. Their children are involved in getting them to assisted living. Pt and her  are now in favor of this set up.          Problem list, Medication list, Allergies, and Medical/Social/Surgical histories reviewed in EPIC and updated as appropriate.    OBJECTIVE:                                                    /78  Pulse 75  Temp 97.2  F (36.2  C) (Temporal)  Wt 142 lb (64.4 kg)  BMI 24.37 kg/m2    GEN: healthy, alert and no distress      Diagnostic test results:  Results for orders placed or performed in visit on 03/01/17 (from the past 24 hour(s))   Lipid Profile with reflex to direct LDL   Result Value Ref Range    Cholesterol 257 (H) <200 mg/dL    Triglycerides 100 <150 mg/dL    HDL Cholesterol 88 >49 mg/dL    LDL Cholesterol Calculated 149 (H) <100 mg/dL    Non HDL Cholesterol 169 (H) <130 mg/dL   Comprehensive metabolic panel (BMP + Alb, Alk Phos, ALT, AST, Total. Bili, TP)   Result Value Ref Range    Sodium 141 133 - 144 mmol/L    Potassium 3.9 3.4 - 5.3 mmol/L    Chloride 105 94 - 109 mmol/L     Carbon Dioxide 29 20 - 32 mmol/L    Anion Gap 7 3 - 14 mmol/L    Glucose 86 70 - 99 mg/dL    Urea Nitrogen 23 7 - 30 mg/dL    Creatinine 0.87 0.52 - 1.04 mg/dL    GFR Estimate 62 >60 mL/min/1.7m2    GFR Estimate If Black 75 >60 mL/min/1.7m2    Calcium 9.2 8.5 - 10.1 mg/dL    Bilirubin Total 1.4 (H) 0.2 - 1.3 mg/dL    Albumin 3.9 3.4 - 5.0 g/dL    Protein Total 7.8 6.8 - 8.8 g/dL    Alkaline Phosphatase 65 40 - 150 U/L    ALT 24 0 - 50 U/L    AST 21 0 - 45 U/L   TSH   Result Value Ref Range    TSH 3.12 0.40 - 4.00 mU/L   T4, free   Result Value Ref Range    T4 Free 0.79 0.76 - 1.46 ng/dL   CBC with platelets and differential   Result Value Ref Range    WBC 5.7 4.0 - 11.0 10e9/L    RBC Count 4.37 3.8 - 5.2 10e12/L    Hemoglobin 13.4 11.7 - 15.7 g/dL    Hematocrit 40.6 35.0 - 47.0 %    MCV 93 78 - 100 fl    MCH 30.7 26.5 - 33.0 pg    MCHC 33.0 31.5 - 36.5 g/dL    RDW 13.3 10.0 - 15.0 %    Platelet Count 206 150 - 450 10e9/L    Diff Method Automated Method     % Neutrophils 68.4 %    % Lymphocytes 22.8 %    % Monocytes 7.1 %    % Eosinophils 1.2 %    % Basophils 0.5 %    Absolute Neutrophil 3.9 1.6 - 8.3 10e9/L    Absolute Lymphocytes 1.3 0.8 - 5.3 10e9/L    Absolute Monocytes 0.4 0.0 - 1.3 10e9/L    Absolute Eosinophils 0.1 0.0 - 0.7 10e9/L    Absolute Basophils 0.0 0.0 - 0.2 10e9/L   Hemoglobin A1c   Result Value Ref Range    Hemoglobin A1C 5.5 4.3 - 6.0 %          ASSESSMENT/PLAN:                                                      81 year old female with the following diagnoses and treatment plan:      ICD-10-CM    1. Dementia without behavioral disturbance, unspecified dementia type F03.90    2. Hyperlipidemia LDL goal <160 E78.5        -- her labs are ok. Lipid panel borderline but does not need treatment. A few other labs for memory loss are not back yet.   -- she is now open to take donepezil. I will send a note to her neurologist to follow up in prescribing it for her.   -- follow up in 5 weeks.          Radha  MD Lg-PhD  [facility]    (Note: Chart documentation was done in part with Dragon Voice Recognition software. Although reviewed after completion, some word and grammatical errors may remain.)

## 2017-03-02 LAB — DEPRECATED CALCIDIOL+CALCIFEROL SERPL-MC: 40 UG/L (ref 20–75)

## 2017-03-03 ENCOUNTER — TELEPHONE (OUTPATIENT)
Dept: NEUROLOGY | Facility: CLINIC | Age: 82
End: 2017-03-03

## 2017-03-03 NOTE — TELEPHONE ENCOUNTER
Left message on home phone for either pt or  to call back and let us know if they plan on having CT and/or keeping FU wed 3/8/17  Darla Severin-Brown, LPN

## 2017-03-03 NOTE — TELEPHONE ENCOUNTER
Attempted to call  (Geraldine) on his cell phone-no answer and voicemail not set up.  Darla Severin-Brown, LPN

## 2017-03-03 NOTE — TELEPHONE ENCOUNTER
Daughter Bharti called stating that all message for pt go top her via email.  She states that appt should stay booked for now but she would prefer that CT and appt could be done the same day.  I explained that pt has 8am with Dr. Bonilla on Wednesday 3/8/17 so CT could not be done that day before appt.  We would have to wait another month to get CT and follow up with Dr. Bonilla on the same day.   Daugher states she will discuss with parents.  Darla Severin-Brown, LPN

## 2017-03-03 NOTE — TELEPHONE ENCOUNTER
----- Message from Allen Bonilla MD sent at 3/3/2017  7:54 AM CST -----  Regarding: Head CT  Nata. Please call this patient. She is demented and may need to speak to her . Ask if she is going to have head CT scan done which was ordered. Remind her she has an appointment with me this coming Wednesday. Thanks JULIUS Bonilla

## 2017-03-04 LAB — METHYLMALONATE SERPL-SCNC: 0.29

## 2017-03-07 NOTE — PROGRESS NOTES
Send letter with the following comment:         Dear Brissa Hunter,     Enclosed is a copy of your test results.    -- your blood work were normal except for Lipid panel is borderline elevated. Prescription medication is not needed at this time. Healthy eating with low fat low cholesterol diet and stay active. We'll monitor this annually with your physical.          If you have additional question, please call or make a follow-up appointment.    Radha Castanon MD

## 2017-05-04 ENCOUNTER — DOCUMENTATION ONLY (OUTPATIENT)
Dept: OTHER | Facility: CLINIC | Age: 82
End: 2017-05-04

## 2017-05-04 DIAGNOSIS — Z71.89 ADVANCED DIRECTIVES, COUNSELING/DISCUSSION: Chronic | ICD-10-CM

## 2018-09-06 ENCOUNTER — TELEPHONE (OUTPATIENT)
Dept: FAMILY MEDICINE | Facility: OTHER | Age: 83
End: 2018-09-06

## 2020-03-10 ENCOUNTER — HEALTH MAINTENANCE LETTER (OUTPATIENT)
Age: 85
End: 2020-03-10

## 2020-12-27 ENCOUNTER — HEALTH MAINTENANCE LETTER (OUTPATIENT)
Age: 85
End: 2020-12-27

## 2021-04-24 ENCOUNTER — HEALTH MAINTENANCE LETTER (OUTPATIENT)
Age: 86
End: 2021-04-24

## 2021-10-09 ENCOUNTER — HEALTH MAINTENANCE LETTER (OUTPATIENT)
Age: 86
End: 2021-10-09

## 2022-05-16 ENCOUNTER — HEALTH MAINTENANCE LETTER (OUTPATIENT)
Age: 87
End: 2022-05-16

## 2022-09-11 ENCOUNTER — HEALTH MAINTENANCE LETTER (OUTPATIENT)
Age: 87
End: 2022-09-11

## 2023-06-03 ENCOUNTER — HEALTH MAINTENANCE LETTER (OUTPATIENT)
Age: 88
End: 2023-06-03

## 2024-06-17 PROBLEM — Z71.89 ADVANCED DIRECTIVES, COUNSELING/DISCUSSION: Status: RESOLVED | Noted: 2017-05-04 | Resolved: 2024-06-17
